# Patient Record
Sex: MALE | Race: WHITE | Employment: STUDENT | URBAN - METROPOLITAN AREA
[De-identification: names, ages, dates, MRNs, and addresses within clinical notes are randomized per-mention and may not be internally consistent; named-entity substitution may affect disease eponyms.]

---

## 2017-08-16 ENCOUNTER — OFFICE VISIT (OUTPATIENT)
Dept: OTHER | Facility: HOSPITAL | Age: 27
End: 2017-08-16
Attending: FAMILY MEDICINE

## 2017-08-16 DIAGNOSIS — Z01.84 IMMUNITY STATUS TESTING: Primary | ICD-10-CM

## 2017-08-16 LAB
HBV SURFACE AB SER QL: NONREACTIVE
HBV SURFACE AB SERPL IA-ACNC: 3.76 MIU/ML
RUBELLA IGG QUANTITATIVE: 57.2 IU/ML
RUBV IGG SER QL: POSITIVE

## 2017-08-16 PROCEDURE — 86762 RUBELLA ANTIBODY: CPT

## 2017-08-16 PROCEDURE — 86735 MUMPS ANTIBODY: CPT

## 2017-08-16 PROCEDURE — 86787 VARICELLA-ZOSTER ANTIBODY: CPT

## 2017-08-16 PROCEDURE — 86480 TB TEST CELL IMMUN MEASURE: CPT

## 2017-08-16 PROCEDURE — 86765 RUBEOLA ANTIBODY: CPT

## 2017-08-16 PROCEDURE — 86706 HEP B SURFACE ANTIBODY: CPT

## 2017-08-18 LAB
MEV IGG SER IA-ACNC: POSITIVE
MUV IGG SER IA-ACNC: POSITIVE
VZV IGG SER IA-ACNC: POSITIVE

## 2017-08-22 LAB
M TB TUBERC IFN-G BLD QL: NEGATIVE
M TB TUBERC IFN-G/MITOGEN IGNF BLD: 0 IU/ML
M TB TUBERC IGNF/MITOGEN IGNF CONTROL: 9.43 IU/ML
MITOGEN IGNF BCKGRD COR BLD-ACNC: 0.01 IU/ML

## 2017-08-25 ENCOUNTER — APPOINTMENT (OUTPATIENT)
Dept: OTHER | Facility: HOSPITAL | Age: 27
End: 2017-08-25
Attending: PREVENTIVE MEDICINE

## 2017-08-29 ENCOUNTER — APPOINTMENT (OUTPATIENT)
Dept: OTHER | Facility: HOSPITAL | Age: 27
End: 2017-08-29
Attending: PREVENTIVE MEDICINE

## 2017-09-19 ENCOUNTER — OFFICE VISIT (OUTPATIENT)
Dept: OTHER | Facility: HOSPITAL | Age: 27
End: 2017-09-19
Attending: PREVENTIVE MEDICINE

## 2017-09-19 DIAGNOSIS — Z00.00 ANNUAL PHYSICAL EXAM: Primary | ICD-10-CM

## 2017-09-19 LAB
ALBUMIN SERPL-MCNC: 4.1 G/DL (ref 3.5–4.8)
ALP LIVER SERPL-CCNC: 99 U/L (ref 45–117)
ALT SERPL-CCNC: 26 U/L (ref 17–63)
AST SERPL-CCNC: 15 U/L (ref 15–41)
ATRIAL RATE: 72 BPM
BASOPHILS # BLD AUTO: 0.05 X10(3) UL (ref 0–0.1)
BASOPHILS NFR BLD AUTO: 0.6 %
BILIRUB SERPL-MCNC: 0.2 MG/DL (ref 0.1–2)
BILIRUB UR QL STRIP.AUTO: NEGATIVE
BUN BLD-MCNC: 12 MG/DL (ref 8–20)
CALCIUM BLD-MCNC: 9 MG/DL (ref 8.3–10.3)
CHLORIDE: 109 MMOL/L (ref 101–111)
CHOLEST SMN-MCNC: 207 MG/DL (ref ?–200)
CLARITY UR REFRACT.AUTO: CLEAR
CO2: 24 MMOL/L (ref 22–32)
COLOR UR AUTO: YELLOW
CREAT BLD-MCNC: 1.03 MG/DL (ref 0.7–1.3)
EOSINOPHIL # BLD AUTO: 0.18 X10(3) UL (ref 0–0.3)
EOSINOPHIL NFR BLD AUTO: 2.3 %
ERYTHROCYTE [DISTWIDTH] IN BLOOD BY AUTOMATED COUNT: 12.4 % (ref 11.5–16)
GAMMA GLUTAMYL TRANSFERASE: 23 U/L (ref 15–85)
GLUCOSE BLD-MCNC: 90 MG/DL (ref 70–99)
GLUCOSE UR STRIP.AUTO-MCNC: NEGATIVE MG/DL
HAV IGM SER QL: 2.2 MG/DL (ref 1.7–3)
HCT VFR BLD AUTO: 45.5 % (ref 37–53)
HDLC SERPL-MCNC: 47 MG/DL (ref 45–?)
HDLC SERPL: 4.4 {RATIO} (ref ?–4.97)
HGB BLD-MCNC: 15.4 G/DL (ref 13–17)
IMMATURE GRANULOCYTE COUNT: 0.07 X10(3) UL (ref 0–1)
IMMATURE GRANULOCYTE RATIO %: 0.9 %
IRON: 76 UG/DL (ref 45–182)
LDH: 191 U/L (ref 84–249)
LDLC SERPL CALC-MCNC: 122 MG/DL (ref ?–130)
LDLC SERPL-MCNC: 38 MG/DL (ref 5–40)
LEUKOCYTE ESTERASE UR QL STRIP.AUTO: NEGATIVE
LYMPHOCYTES # BLD AUTO: 2.11 X10(3) UL (ref 0.9–4)
LYMPHOCYTES NFR BLD AUTO: 27 %
M PROTEIN MFR SERPL ELPH: 8 G/DL (ref 6.1–8.3)
MCH RBC QN AUTO: 29.4 PG (ref 27–33.2)
MCHC RBC AUTO-ENTMCNC: 33.8 G/DL (ref 31–37)
MCV RBC AUTO: 87 FL (ref 80–99)
MONOCYTES # BLD AUTO: 1.06 X10(3) UL (ref 0.1–0.6)
MONOCYTES NFR BLD AUTO: 13.6 %
NEUTROPHIL ABS PRELIM: 4.35 X10 (3) UL (ref 1.3–6.7)
NEUTROPHILS # BLD AUTO: 4.35 X10(3) UL (ref 1.3–6.7)
NEUTROPHILS NFR BLD AUTO: 55.6 %
NITRITE UR QL STRIP.AUTO: NEGATIVE
P AXIS: 48 DEGREES
P-R INTERVAL: 166 MS
PH UR STRIP.AUTO: 5 [PH] (ref 4.5–8)
PHOSPHATE SERPL-MCNC: 2.8 MG/DL (ref 2.5–4.9)
PLATELET # BLD AUTO: 284 10(3)UL (ref 150–450)
POTASSIUM SERPL-SCNC: 4 MMOL/L (ref 3.6–5.1)
PROT UR STRIP.AUTO-MCNC: NEGATIVE MG/DL
Q-T INTERVAL: 358 MS
QRS DURATION: 104 MS
QTC CALCULATION (BEZET): 392 MS
R AXIS: 63 DEGREES
RBC # BLD AUTO: 5.23 X10(6)UL (ref 4.3–5.7)
RBC UR QL AUTO: NEGATIVE
RED CELL DISTRIBUTION WIDTH-SD: 39.4 FL (ref 35.1–46.3)
SODIUM SERPL-SCNC: 142 MMOL/L (ref 136–144)
SP GR UR STRIP.AUTO: 1.03 (ref 1–1.03)
T AXIS: 32 DEGREES
TRIGLYCERIDES: 192 MG/DL (ref ?–150)
URIC ACID: 6.9 MG/DL (ref 2.4–8.7)
UROBILINOGEN UR STRIP.AUTO-MCNC: <2 MG/DL
VENTRICULAR RATE: 72 BPM
WBC # BLD AUTO: 7.8 X10(3) UL (ref 4–13)

## 2017-09-19 PROCEDURE — 85025 COMPLETE CBC W/AUTO DIFF WBC: CPT

## 2017-09-19 PROCEDURE — 80053 COMPREHEN METABOLIC PANEL: CPT

## 2017-09-19 PROCEDURE — 81003 URINALYSIS AUTO W/O SCOPE: CPT

## 2017-09-19 PROCEDURE — 80061 LIPID PANEL: CPT

## 2017-09-22 ENCOUNTER — OFFICE VISIT (OUTPATIENT)
Dept: OTHER | Facility: HOSPITAL | Age: 27
End: 2017-09-22
Attending: PREVENTIVE MEDICINE

## 2017-09-23 NOTE — H&P
PATIENT'S NAME: Azucena Kraus   ATTENDING PHYSICIAN: Earnest Tsai M.D.    PATIENT ACCOUNT #: [de-identified] LOCATION: [address] [place] [address] RECORD #: [id number] YOB: 1990   DATE OF SERVICE: 09/22/2017       EXECUTIVE HISTORY AND OSHA respirator questionnaire was reviewed, and no positive answers were given. A TB skin test was placed and will be read at the department in 48 to 72 hours.       SUMMARY AND RECOMMENDATIONS:  This is a healthy 26-year-old man who is fit to work

## 2020-06-10 ENCOUNTER — HOSPITAL (OUTPATIENT)
Dept: OTHER | Age: 30
End: 2020-06-10
Attending: INTERNAL MEDICINE

## 2020-06-10 ENCOUNTER — HOSPITAL (OUTPATIENT)
Dept: OTHER | Age: 30
End: 2020-06-10

## 2020-06-10 LAB
A/G RATIO_: 1.3
A/G RATIO_: 1.3
ABS LYMPH: 2.3 K/CUMM (ref 1–3.5)
ABS LYMPH: 2.3 K/CUMM (ref 1–3.5)
ABS MONO: 0.8 K/CUMM (ref 0.1–0.8)
ABS MONO: 0.8 K/CUMM (ref 0.1–0.8)
ABS NEUTRO: 4.6 K/CUMM (ref 2–8)
ABS NEUTRO: 4.6 K/CUMM (ref 2–8)
ALBUMIN: 4.5 G/DL (ref 3.5–5)
ALBUMIN: 4.5 G/DL (ref 3.5–5)
ALK PHOS: 89 UNIT/L (ref 50–124)
ALK PHOS: 89 UNIT/L (ref 50–124)
ALT/GPT: 24 UNIT/L (ref 0–55)
ALT/GPT: 24 UNIT/L (ref 0–55)
ANION GAP SERPL CALC-SCNC: 14 MEQ/L (ref 10–20)
ANION GAP SERPL CALC-SCNC: 14 MEQ/L (ref 10–20)
AST/GOT: 24 UNIT/L (ref 5–34)
AST/GOT: 24 UNIT/L (ref 5–34)
BASOPHIL: 1 % (ref 0–1)
BASOPHIL: 1 % (ref 0–1)
BILI TOTAL: 0.6 MG/DL (ref 0.2–1)
BILI TOTAL: 0.6 MG/DL (ref 0.2–1)
BUN SERPL-MCNC: 11 MG/DL (ref 6–20)
BUN SERPL-MCNC: 11 MG/DL (ref 6–20)
CALCIUM: 9.8 MG/DL (ref 8.4–10.2)
CALCIUM: 9.8 MG/DL (ref 8.4–10.2)
CHLORIDE: 101 MEQ/L (ref 97–107)
CHLORIDE: 101 MEQ/L (ref 97–107)
CREATININE: 1.07 MG/DL (ref 0.6–1.3)
CREATININE: 1.07 MG/DL (ref 0.6–1.3)
DIFF_TYPE?: NORMAL
EOSINOPHIL: 3 % (ref 0–6)
EOSINOPHIL: 3 % (ref 0–6)
GLOBULIN_: 3.5 G/DL (ref 2–4.1)
GLOBULIN_: 3.5 G/DL (ref 2–4.1)
GLUCOSE LVL: 95 MG/DL (ref 70–99)
GLUCOSE LVL: 95 MG/DL (ref 70–99)
HCT VFR BLD CALC: 47 % (ref 36–51)
HCT VFR BLD CALC: 47 % (ref 36–51)
HEMOLYSIS 2+: NEGATIVE
HGB BLD-MCNC: 15.7 G/DL (ref 12–17)
HGB BLD-MCNC: 15.7 G/DL (ref 12–17)
IMMATURE GRAN: 0.9 % (ref 0–0.3)
IMMATURE GRAN: 0.9 % (ref 0–0.3)
INSTR WBC: 8.1 K/CUMM (ref 4–11)
LIPEMIC 3+: NEGATIVE
LYMPHOCYTE: 28 %
LYMPHOCYTE: 28 %
MCH RBC QN AUTO: 29 PG (ref 25–35)
MCH RBC QN AUTO: 29 PG (ref 25–35)
MCHC RBC AUTO-ENTMCNC: 34 G/DL (ref 32–37)
MCHC RBC AUTO-ENTMCNC: 34 G/DL (ref 32–37)
MCV RBC AUTO: 88 FL (ref 78–97)
MCV RBC AUTO: 88 FL (ref 78–97)
MONOCYTE: 10 %
MONOCYTE: 10 %
NEUTROPHIL: 57 %
NEUTROPHIL: 57 %
NRBC BLD MANUAL-RTO: 0 % (ref 0–0.2)
NRBC BLD MANUAL-RTO: 0 % (ref 0–0.2)
PLATELET: 288 K/CUMM (ref 150–450)
PLATELET: 288 K/CUMM (ref 150–450)
POTASSIUM: 4.8 MEQ/L (ref 3.5–5.1)
POTASSIUM: 4.8 MEQ/L (ref 3.5–5.1)
RBC # BLD: 5.34 M/CUMM (ref 4.2–6)
RBC # BLD: 5.34 M/CUMM (ref 4.2–6)
RDW: 12.3 % (ref 11.5–14.5)
RDW: 12.3 % (ref 11.5–14.5)
SODIUM: 136 MEQ/L (ref 136–145)
SODIUM: 136 MEQ/L (ref 136–145)
TCO2: 26 MEQ/L (ref 19–29)
TCO2: 26 MEQ/L (ref 19–29)
TOTAL PROTEIN: 8 G/DL (ref 6.4–8.3)
TOTAL PROTEIN: 8 G/DL (ref 6.4–8.3)
WBC # BLD: 8.1 K/CUMM (ref 4–11)
WBC # BLD: 8.1 K/CUMM (ref 4–11)

## 2020-06-10 PROCEDURE — 93010 ELECTROCARDIOGRAM REPORT: CPT | Performed by: INTERNAL MEDICINE

## 2020-06-11 LAB — REPORT TEXT: NORMAL

## (undated) NOTE — MR AVS SNAPSHOT
After Visit Summary   9/22/2017    Adeel Lorenz    MRN: [id number]           Visit Information     Date & Time  9/22/2017 11:15 AM Provider  [facility]  Phone  [phone] If you have questions, you can call [phone] to talk to our [phone] staff. Remember, [facility] is NOT to be used for urgent needs. For medical emergencies, dial 911.     Sincerely,    Stephenie MARTINEZ now offers Video Visits through Conditions needing urgent attention, but are not life-threatening. Average cost  $120*       EMERGENCY ROOM         Life-threatening emergencies needing immediate intervention   at a hospital emergency room.       Average cost  $2,300*   *Cost